# Patient Record
Sex: FEMALE | Race: OTHER | URBAN - METROPOLITAN AREA
[De-identification: names, ages, dates, MRNs, and addresses within clinical notes are randomized per-mention and may not be internally consistent; named-entity substitution may affect disease eponyms.]

---

## 2017-06-16 ENCOUNTER — OUTPATIENT (OUTPATIENT)
Dept: OUTPATIENT SERVICES | Facility: HOSPITAL | Age: 42
LOS: 1 days | Discharge: HOME | End: 2017-06-16

## 2017-06-23 ENCOUNTER — OUTPATIENT (OUTPATIENT)
Dept: OUTPATIENT SERVICES | Facility: HOSPITAL | Age: 42
LOS: 1 days | Discharge: HOME | End: 2017-06-23

## 2017-06-28 DIAGNOSIS — Z12.31 ENCOUNTER FOR SCREENING MAMMOGRAM FOR MALIGNANT NEOPLASM OF BREAST: ICD-10-CM

## 2017-06-28 DIAGNOSIS — R92.8 OTHER ABNORMAL AND INCONCLUSIVE FINDINGS ON DIAGNOSTIC IMAGING OF BREAST: ICD-10-CM

## 2018-12-07 ENCOUNTER — OUTPATIENT (OUTPATIENT)
Dept: OUTPATIENT SERVICES | Facility: HOSPITAL | Age: 43
LOS: 1 days | Discharge: HOME | End: 2018-12-07

## 2018-12-07 DIAGNOSIS — Z12.31 ENCOUNTER FOR SCREENING MAMMOGRAM FOR MALIGNANT NEOPLASM OF BREAST: ICD-10-CM

## 2021-01-21 PROBLEM — Z00.00 ENCOUNTER FOR PREVENTIVE HEALTH EXAMINATION: Status: ACTIVE | Noted: 2021-01-21

## 2021-02-11 ENCOUNTER — APPOINTMENT (OUTPATIENT)
Dept: HEMATOLOGY ONCOLOGY | Facility: CLINIC | Age: 46
End: 2021-02-11
Payer: COMMERCIAL

## 2021-02-11 ENCOUNTER — LABORATORY RESULT (OUTPATIENT)
Age: 46
End: 2021-02-11

## 2021-02-11 ENCOUNTER — OUTPATIENT (OUTPATIENT)
Dept: OUTPATIENT SERVICES | Facility: HOSPITAL | Age: 46
LOS: 1 days | Discharge: HOME | End: 2021-02-11

## 2021-02-11 VITALS
DIASTOLIC BLOOD PRESSURE: 85 MMHG | TEMPERATURE: 98.6 F | WEIGHT: 193 LBS | RESPIRATION RATE: 16 BRPM | HEART RATE: 81 BPM | SYSTOLIC BLOOD PRESSURE: 133 MMHG | HEIGHT: 64 IN | BODY MASS INDEX: 32.95 KG/M2

## 2021-02-11 DIAGNOSIS — Z91.89 OTHER SPECIFIED PERSONAL RISK FACTORS, NOT ELSEWHERE CLASSIFIED: ICD-10-CM

## 2021-02-11 DIAGNOSIS — I63.9 CEREBRAL INFARCTION, UNSPECIFIED: ICD-10-CM

## 2021-02-11 DIAGNOSIS — Z78.9 OTHER SPECIFIED HEALTH STATUS: ICD-10-CM

## 2021-02-11 PROCEDURE — 99205 OFFICE O/P NEW HI 60 MIN: CPT

## 2021-02-11 NOTE — HISTORY OF PRESENT ILLNESS
[de-identified] : Patient is a derrick 44 yo lady with h/o of COVID in 3/2020, with multiple sequelae following including rashes, hypertension, on 12/27/2020 she developed L-sided numbness in leg and arm as well as right-sided facial droop, also associated with visual disturbance, she presented to [facility] ER was initially treated for migraine and vertigo, as per patient MRI confirmed a CVA. Unfortunately i do not have any records of images obtained at [facility] for my review. \par Patient reports residual numbness in her L side and reports she required rehab as Hui Thomas post hospitalization. \par She currently takes Lipitor 80mg, Norvasc 2.5mg and full ASA. \par She reports no prior h/o VTE/CVA, she was adopted, not aware of her family history, she is a former smoker, has h/o PORSCHE BSO 2/2 endometriosis, h/o IVF, uneventful pregnancy, normal vaginal delivery of her 13 yo daughter. She reports h/o 1 miscarriage at 8 weeks. \par She is up to date with age appropriate cancer screening.  [de-identified] : 2/11/2021: Today she is feeling well, reports no problems with ASA, no bleeding, brusing, BP is well controlled. \par We have discussed the etiologies of ischemic vs embolic stroke, since i am missing records i am assuming patient had a cryptogenic stroke and was sent for hypercoagulable work up. We will ask for [facility] records. \par I have explained to her that I recommend follow up with neurology, additional tests may be required. \par If any reason to suspect hypercoagulable state she may need to be anticoagulated. This was explained to her. \par

## 2021-02-11 NOTE — REASON FOR VISIT
[Initial Consultation] : an initial consultation for [Family Member] : family member [FreeTextEntry2] : CVA, referred By Dr. Reyes

## 2021-02-11 NOTE — ASSESSMENT
[FreeTextEntry1] : H/o CVA diagnosed 12/27/2021 at [facility]\par --Obtain all records, will request imaging from [facility]\par --Hypercoag work up today \par --No known family h/o VTE/CVA, patient was adopted \par --Former smoker, former h/o of OCP, h/o IVF, hysterectomy not complicated by VTE\par --1st trimester pregnancy loss, have a 11 yo daughter, uncomplicated pregnancy and delivery, required Lovenox ppx during pregnancy \par --On full ASA\par --On Lipitor 80mg daily, low dose Norvasc\par --H/o COVID in March 2020\par --Neurology follow up \par \par Reports she is up to date on mammograms and pap smears \par \par Follow up in 3 weeks

## 2021-02-11 NOTE — CONSULT LETTER
[Dear  ___] : Dear  [unfilled], [Consult Letter:] : I had the pleasure of evaluating your patient, [unfilled]. [( Thank you for referring [unfilled] for consultation for _____ )] : Thank you for referring [unfilled] for consultation for [unfilled] [Please see my note below.] : Please see my note below. [Consult Closing:] : Thank you very much for allowing me to participate in the care of this patient.  If you have any questions, please do not hesitate to contact me. [Sincerely,] : Sincerely, [FreeTextEntry3] : Valerie Barnett MD

## 2021-02-11 NOTE — PHYSICAL EXAM
[Restricted in physically strenuous activity but ambulatory and able to carry out work of a light or sedentary nature] : Status 1- Restricted in physically strenuous activity but ambulatory and able to carry out work of a light or sedentary nature, e.g., light house work, office work [Normal] : affect appropriate [de-identified] : walks with cane

## 2021-02-12 ENCOUNTER — LABORATORY RESULT (OUTPATIENT)
Age: 46
End: 2021-02-12

## 2021-03-01 DIAGNOSIS — I63.9 CEREBRAL INFARCTION, UNSPECIFIED: ICD-10-CM

## 2021-03-01 DIAGNOSIS — Z91.89 OTHER SPECIFIED PERSONAL RISK FACTORS, NOT ELSEWHERE CLASSIFIED: ICD-10-CM

## 2021-03-01 DIAGNOSIS — Z78.9 OTHER SPECIFIED HEALTH STATUS: ICD-10-CM

## 2021-03-04 ENCOUNTER — APPOINTMENT (OUTPATIENT)
Dept: HEMATOLOGY ONCOLOGY | Facility: CLINIC | Age: 46
End: 2021-03-04

## 2021-08-11 LAB
ALBUMIN SERPL ELPH-MCNC: 4.5 G/DL
ALP BLD-CCNC: 85 U/L
ALT SERPL-CCNC: 27 U/L
ANION GAP SERPL CALC-SCNC: 14 MMOL/L
APTT HEX PL PPP: NEGATIVE
AST SERPL-CCNC: 22 U/L
AT III PPP CHRO-ACNC: 128 %
B2 GLYCOPROT1 IGA SERPL IA-ACNC: <5 SAU
B2 GLYCOPROT1 IGG SER-ACNC: <5 SGU
B2 GLYCOPROT1 IGM SER-ACNC: <5 SMU
BILIRUB SERPL-MCNC: 0.6 MG/DL
BUN SERPL-MCNC: 7 MG/DL
CALCIUM SERPL-MCNC: 11.4 MG/DL
CARDIOLIPIN IGM SER-MCNC: 7.2 MPL
CARDIOLIPIN IGM SER-MCNC: <5 GPL
CHLORIDE SERPL-SCNC: 102 MMOL/L
CO2 SERPL-SCNC: 25 MMOL/L
CONFIRM: 32.7 SEC
CREAT SERPL-MCNC: 0.7 MG/DL
DRVVT IMM 1:2 NP PPP: NORMAL
DRVVT SCREEN TO CONFIRM RATIO: 0.97 RATIO
ESTIMATED AVERAGE GLUCOSE: 137 MG/DL
FERRITIN SERPL-MCNC: 254 NG/ML
GLUCOSE SERPL-MCNC: 88 MG/DL
HBA1C MFR BLD HPLC: 6.4 %
HCT VFR BLD CALC: 41 %
HEX-1: 37.9 SECS
HEX-2: 38.3 SECS
HGB BLD-MCNC: 13.8 G/DL
IRON SATN MFR SERPL: 33 %
IRON SERPL-MCNC: 99 UG/DL
MCHC RBC-ENTMCNC: 30.6 PG
MCHC RBC-ENTMCNC: 33.7 G/DL
MCV RBC AUTO: 90.9 FL
PLATELET # BLD AUTO: 388 K/UL
PMV BLD: 9.2 FL
POTASSIUM SERPL-SCNC: 3.7 MMOL/L
PROT C PPP CHRO-ACNC: 159 %
PROT S AG ACT/NOR PPP IA: 89 %
PROT S FREE PPP-ACNC: 89 %
PROT SERPL-MCNC: 8 G/DL
RBC # BLD: 4.51 M/UL
RBC # FLD: 11.7 %
SARS-COV-2 IGG SERPL IA-ACNC: 27 INDEX
SARS-COV-2 IGG SERPL QL IA: POSITIVE
SCREEN DRVVT: 32.3 SEC
SILICA CLOTTING TIME INTERPRETATION: NORMAL
SILICA CLOTTING TIME S/C: 1.07 RATIO
SODIUM SERPL-SCNC: 141 MMOL/L
TIBC SERPL-MCNC: 296 UG/DL
UIBC SERPL-MCNC: 197 UG/DL
WBC # FLD AUTO: 9.69 K/UL

## 2021-12-02 ENCOUNTER — OUTPATIENT (OUTPATIENT)
Dept: OUTPATIENT SERVICES | Facility: HOSPITAL | Age: 46
LOS: 1 days | Discharge: HOME | End: 2021-12-02
Payer: COMMERCIAL

## 2021-12-02 DIAGNOSIS — Z12.31 ENCOUNTER FOR SCREENING MAMMOGRAM FOR MALIGNANT NEOPLASM OF BREAST: ICD-10-CM

## 2021-12-02 PROCEDURE — 77067 SCR MAMMO BI INCL CAD: CPT | Mod: 26

## 2021-12-02 PROCEDURE — 77063 BREAST TOMOSYNTHESIS BI: CPT | Mod: 26

## 2021-12-08 ENCOUNTER — OUTPATIENT (OUTPATIENT)
Dept: OUTPATIENT SERVICES | Facility: HOSPITAL | Age: 46
LOS: 1 days | Discharge: HOME | End: 2021-12-08
Payer: SELF-PAY

## 2021-12-08 DIAGNOSIS — R92.8 OTHER ABNORMAL AND INCONCLUSIVE FINDINGS ON DIAGNOSTIC IMAGING OF BREAST: ICD-10-CM

## 2021-12-08 PROCEDURE — 77065 DX MAMMO INCL CAD UNI: CPT | Mod: 26,LT

## 2021-12-08 PROCEDURE — 76642 ULTRASOUND BREAST LIMITED: CPT | Mod: 26,LT

## 2021-12-08 PROCEDURE — G0279: CPT | Mod: 26

## 2022-01-05 ENCOUNTER — OUTPATIENT (OUTPATIENT)
Dept: OUTPATIENT SERVICES | Facility: HOSPITAL | Age: 47
LOS: 1 days | Discharge: HOME | End: 2022-01-05
Payer: COMMERCIAL

## 2022-01-05 ENCOUNTER — RESULT REVIEW (OUTPATIENT)
Age: 47
End: 2022-01-05

## 2022-01-05 PROCEDURE — 19083 BX BREAST 1ST LESION US IMAG: CPT | Mod: LT

## 2022-01-05 PROCEDURE — 19081 BX BREAST 1ST LESION STRTCTC: CPT | Mod: LT

## 2022-01-05 PROCEDURE — 88305 TISSUE EXAM BY PATHOLOGIST: CPT | Mod: 26

## 2022-01-06 LAB — SURGICAL PATHOLOGY STUDY: SIGNIFICANT CHANGE UP

## 2022-01-07 DIAGNOSIS — N63.20 UNSPECIFIED LUMP IN THE LEFT BREAST, UNSPECIFIED QUADRANT: ICD-10-CM

## 2022-02-15 ENCOUNTER — APPOINTMENT (OUTPATIENT)
Dept: BREAST CENTER | Facility: CLINIC | Age: 47
End: 2022-02-15
Payer: COMMERCIAL

## 2022-02-15 PROCEDURE — 99203 OFFICE O/P NEW LOW 30 MIN: CPT

## 2022-02-15 NOTE — DATA REVIEWED
[FreeTextEntry1] : EXAM:  MG MAMMO SCREEN W MICHELLE BI#      \par 12/02/2021  \par INTERPRETATION:  HISTORY:\par Bilateral MG MAMMO SCREEN W MICHELLE BI# was performed. Patient is 46 years old and is seen for screening. The patient has no personal history of cancer.  The patient has no family history of breast cancer.\par \par RISK ASSESSMENT:\par NCI Lifetime Risk: 12.8\par Tyrer-Masoodzick Lifetime Risk: 13.0\par \par CLINICAL BREAST EXAM:\par The patient reports her last clinical breast exam was performed over one year ago.\par \par COMPARISON STUDIES:\par The present examination has been compared to prior imaging studies performed at [facility] on 06/16/2017, 06/23/2017 and 12/07/2018.\par \par MAMMOGRAM FINDINGS:\par Mammography was performed including the following views: bilateral craniocaudal with tomosynthesis, bilateral mediolateral oblique with tomosynthesis, and left mediolateral oblique.  The examination includes digital synthetic 2D and digital tomosynthesis 3D images. Additional imaging analysis was performed using CAD (computer-aided detection) software.\par \par The breasts are heterogeneously dense, which may obscure small masses.\par \par There is an asymmetry seen in the central of the left breast.\par \par No suspicious mass, grouping of calcifications, or other abnormality is identified in the right breast.\par \par IMPRESSION:\par Asymmetry in the left breast requires additional evaluation.\par \par RECOMMENDATION:\par Patient will be recalled for additional mammographic views and breast ultrasound.\par \par ASSESSMENT:\par BI-RADS Category 0:  Incomplete: Needs Additional Imaging Evaluation\par EXAM:  MG US BREAST LIMITED LT      \par EXAM:  MG MAMMO DIAG W MICHELLE LT#      \par \par *** ADDENDUM ***\par \par Findings and recommendations were discussed with CARMITA Ospina, on \par 12/92/2021 at 3:37 PM.\par \par --- End of Report ---\par \par *** END OF ADDENDUM ***\par   12/08/2021  \par INTERPRETATION:  CLINICAL HISTORY: Additional imaging requested from \par screening mammogram. The patient returns for additional imaging of an \par asymmetry in the left breast.\par \par The patient reports her last clinical breast examination was performed \par over one year ago.\par \par FAMILY HISTORY: None.\par \par COMPARISONS: Mammograms back to 2015.\par \par BREAST COMPOSITION: The breasts are heterogeneously dense, which may \par obscure small masses.\par \par VIEWS: 2D/tomosynthesis ML and spot views of the left breast. \par Computer-aided detection was utilized by the radiologists in the \par interpretation of this examination.\par \par \par FINDINGS:\par \par MAMMOGRAM:\par Slightly irregular mass in the left lateral breast corresponds to the \par abnormality noted on the screening mammogram.\par \par No suspicious calcifications are seen.\par \par ULTRASOUND:\par Targeted left breast ultrasound was performed.\par \par Benign intramammary lymph node is seen in the left breast, 3:00 axis, 12 \par cm from the nipple. 2 adjacent small masses in the left breast, 3-4 \par o'clock axis, 2 cm from the nipple measure up to 0.4 cm. These are an \par incidental finding and are indeterminate. Ultrasound-guided biopsy is \par recommended.\par \par \par IMPRESSION:\par \par 1. Mammographically identified mass in the left breast. Stereotactic \par guided biopsy is recommended.\par 2. Indeterminate left breast masses seen on ultrasound. Ultrasound-guided \par biopsy is recommended.\par \par Recommendation: Stereotactic guided biopsy.\par \par BI-RADS Category 4: Suspicious\par EXAM:  MG STEREO BX 1ST LT SISC      \par EXAM:  MG US BX BRST 1ST LT SISC      \par \par *** ADDENDUM ***\par \par FINAL DIAGNOSIS\par \par 1. BREAST, LEFT 3-4 N2 MASS, ULTRASOUND GUIDED NEEDLE CORE BIOPSIES:\par - INTRADUCTAL PAPILLOMA CONTAINING FLORID DUCT HYPERPLASIA ARISING IN A\par BACKGROUND OF NODULAR SCLEROSING ADENOSIS.\par \par Findings are benign high risk concordant.\par \par 2. BREAST, LEFT UPPER OUTER QUADRANT MASS, STEREOTACTIC GUIDED VACUUM\par ASSISTED NEEDLE CORE BIOPSIES:\par - BENIGN PREDOMINANTLY FATTY BREAST TISSUE WITH FRESH HEMORRHAGE, BUT\par OTHERWISE WITHOUT HISTOPATHOLOGIC ABNORMALITY\par \par Findings are benign concordant.\par \par Recommendations: Surgical management of the benign high risk lesion is \par recommended.\par \par Findings and recommendations were discussed with the patient on 1/7/2022.\par \par --- End of Report ---\par \par *** END OF ADDENDUM ***\par \par \par \par PROCEDURE DATE:  01/05/2022  \par INTERPRETATION:  Clinical History / Reason for exam: Left breast masses\par \par TECHNIQUE: Previous films and reports were reviewed. The procedure, its \par risks, benefits, and alternatives were explained to the patient, who \par expressed understanding and gave informed written and verbal consent. A \par time-out, which included the patient's full name, date of birth, \par description of the expected procedure and procedure site, was performed \par immediately before the procedure.\par \par Site 1:\par Using the usual sterile technique and ultrasound guidance, the previously \par described mass in the left breast 3-4:00 was biopsied utilizing a \par 14-gauge automated Bard core biopsy device with a 13-gauge introducer \par sheath. 5 samples were collected.  A marking clip ([employer] stop-light) \par was deployed under ultrasound guidance. Hemostasis was obtained and a \par sterile dressing was applied.\par \par Site 2:\par The left breast   was compressed and stereo and tomographic images were \par obtained to locate the mass. The mass is located in the upper outer \par quadrant of the breast. A superior approach was used. The area was \par prepped and draped in the normal sterile fashion.  The skin was \par anesthetized with 5 mL buffered 1% lidocaine. A small skin incision was \par made.  Through the incision, using a biopsy gun, a 9 gauge needle was \par introduced and pre-and post-fire stereo images were obtained. Deep \par anesthesia was given with 10 mL 1% lidocaine with epinephrine. Numerous \par specimens were taken. The specimens were x-rayed and several of them \par contain microcalcifications.\par \par Through the needle a radiopaque marker ([employer] mini-cork) was deposited. \par  The needle was removed and the breast was compressed to achieve \par hemostasis. The patient tolerated the procedure well and there was no \par immediate post procedure complication.\par \par The specimens were sent to pathology.\par \par Post procedure two view mammogram demonstrates the marking clip to be in \par good position.\par \par IMPRESSION:\par \par Successful ultrasound guided and stereotactic core biopsy of the left \par breast.\par

## 2022-02-15 NOTE — ASSESSMENT
[FreeTextEntry1] : Lizzie is 46 year old female here with new dx of left breast intraductal papilloma\par On physical exam, there are no discrete masses in either breast or axilla. There is no nipple discharge or inversion bilaterally. There are no skin changes bilaterally.\par \par Her imaging is as follows:\par 12/02/2021 b/l mammo\par -breasts are heterogeneously dense\par -asymmetry seen in the central of the left breast-->BI-RADS 0\par \par 12/08/2021  LEFT dx mammo and US\par - breasts are heterogeneously dense\par -Slightly irregular mass in the left lateral breast -->Stereotactic guided biopsy\par \par LEFT US:\par -@3N12 Benign intramammary lymph node\par .-@ 3-4 N 2, 2 adjacent small masses measure up to 0.4 cm-->. Ultrasound-guided biopsy\par BIRADS4\par \par \par 01/05/2022  \par LEFT stereotactic bx (mini-cork) \par - BENIGN PREDOMINANTLY FATTY BREAST TISSUE WITH FRESH HEMORRHAGE, BUT\par OTHERWISE WITHOUT HISTOPATHOLOGIC ABNORMALITY\par \par LEFT US guided bx (stop-light) )\par - INTRADUCTAL PAPILLOMA CONTAINING FLORID DUCT HYPERPLASIA ARISING IN A\par BACKGROUND OF NODULAR SCLEROSING ADENOSIS.\par \par We discussed dense breasts.  Increasing breast density has been found to increase ones risk of breast cancer, but at this time, there is no clear indication for additional imaging in this setting, as both US and MRI have not been found to improve survival.  One can consider bilateral screening US.  However, out of 1000 women screened, the use of routine US will only identify an additional 3-5 cancers.  The use of US was found to increase the likelihood of undergoing more imaging and more biopsies.  She does have dense breasts.  We have decided to proceed with screening bilateral breast US at this time.  This will be scheduled with her next screening mammogram.\par \par Intraductal papillomas without atypia are considered fibroproliferative lesions without atypia.  Patients with these lesions were found to have a slightly increased relative risk of breast cancer compared to the reference population.  However the lesions themselves do not have any malignant potential. \par \par Although newer studies regarding the upgrade rate (to cancer) ranges from 0-14% on surgical excision, with pathologic/radiologic concordance, older studies found a higher upgrade rate.  I have recommended surgical excision for this reason.  Because it is not readily palpable, I will have her undergo a preoperative radiofrequency tag localization.  \par \par The risks and benefits of the procedure were explained to the patient, including bleeding, infection, seroma/hematoma formation, and possible re-operation if the surgical excision yields an upgrade to cancer with positive margins. Informed consent was obtained today.\par \par \par PLAN:\par -DX: LEFT BREAST INTRADUCTAL PAPILLOMA \par -OR: LEFT WLE with RF localization\par -follow up after surgery

## 2022-02-15 NOTE — HISTORY OF PRESENT ILLNESS
[FreeTextEntry1] : Lizzie is 46 year old female here with new dx of left breast intraductal papilloma\par \par She has no breast related complaints at this time.  She denies any breast pain, has not palpated any new palpable masses in either breast and denies any nipple discharge or retraction.\par \par Her imaging is as follows:\par 2021 b/l mammo\par -breasts are heterogeneously dense\par -asymmetry seen in the central of the left breast-->BI-RADS 0\par \par 2021  LEFT dx mammo and US\par - breasts are heterogeneously dense\par -Slightly irregular mass in the left lateral breast -->Stereotactic guided biopsy\par \par LEFT US:\par -@3N12 Benign intramammary lymph node\par .-@ 3-4 N 2, 2 adjacent small masses measure up to 0.4 cm-->. Ultrasound-guided biopsy\par BIRADS4\par \par \par 2022  \par LEFT stereotactic bx (mini-cork) \par - BENIGN PREDOMINANTLY FATTY BREAST TISSUE WITH FRESH HEMORRHAGE, BUT\par OTHERWISE WITHOUT HISTOPATHOLOGIC ABNORMALITY\par \par LEFT US guided bx (stop-light) )\par - INTRADUCTAL PAPILLOMA CONTAINING FLORID DUCT HYPERPLASIA ARISING IN A\par BACKGROUND OF NODULAR SCLEROSING ADENOSIS.\par \par HISTORICAL RISK FACTORS:\par -unknown fam hx of breast or ovarian cancer\par -previoius breast bx  x2 current\par - first born at age 32\par -PORSCHE in 2016 for endometriosis \par -OCP use x 3 years

## 2022-02-28 ENCOUNTER — NON-APPOINTMENT (OUTPATIENT)
Age: 47
End: 2022-02-28

## 2022-03-14 ENCOUNTER — OUTPATIENT (OUTPATIENT)
Dept: OUTPATIENT SERVICES | Facility: HOSPITAL | Age: 47
LOS: 1 days | Discharge: HOME | End: 2022-03-14
Payer: COMMERCIAL

## 2022-03-14 VITALS
SYSTOLIC BLOOD PRESSURE: 154 MMHG | WEIGHT: 194.01 LBS | DIASTOLIC BLOOD PRESSURE: 94 MMHG | HEIGHT: 64 IN | RESPIRATION RATE: 16 BRPM | TEMPERATURE: 99 F | OXYGEN SATURATION: 100 % | HEART RATE: 98 BPM

## 2022-03-14 DIAGNOSIS — D24.2 BENIGN NEOPLASM OF LEFT BREAST: ICD-10-CM

## 2022-03-14 DIAGNOSIS — Z01.818 ENCOUNTER FOR OTHER PREPROCEDURAL EXAMINATION: ICD-10-CM

## 2022-03-14 DIAGNOSIS — Z90.710 ACQUIRED ABSENCE OF BOTH CERVIX AND UTERUS: Chronic | ICD-10-CM

## 2022-03-14 LAB
ALBUMIN SERPL ELPH-MCNC: 5 G/DL — SIGNIFICANT CHANGE UP (ref 3.5–5.2)
ALP SERPL-CCNC: 99 U/L — SIGNIFICANT CHANGE UP (ref 30–115)
ALT FLD-CCNC: 34 U/L — SIGNIFICANT CHANGE UP (ref 0–41)
ANION GAP SERPL CALC-SCNC: 15 MMOL/L — HIGH (ref 7–14)
APTT BLD: 33.9 SEC — SIGNIFICANT CHANGE UP (ref 27–39.2)
AST SERPL-CCNC: 26 U/L — SIGNIFICANT CHANGE UP (ref 0–41)
BASOPHILS # BLD AUTO: 0.09 K/UL — SIGNIFICANT CHANGE UP (ref 0–0.2)
BASOPHILS NFR BLD AUTO: 0.7 % — SIGNIFICANT CHANGE UP (ref 0–1)
BILIRUB SERPL-MCNC: 0.4 MG/DL — SIGNIFICANT CHANGE UP (ref 0.2–1.2)
BUN SERPL-MCNC: 13 MG/DL — SIGNIFICANT CHANGE UP (ref 10–20)
CALCIUM SERPL-MCNC: 10 MG/DL — SIGNIFICANT CHANGE UP (ref 8.5–10.1)
CHLORIDE SERPL-SCNC: 97 MMOL/L — LOW (ref 98–110)
CO2 SERPL-SCNC: 26 MMOL/L — SIGNIFICANT CHANGE UP (ref 17–32)
CREAT SERPL-MCNC: 0.6 MG/DL — LOW (ref 0.7–1.5)
EGFR: 112 ML/MIN/1.73M2 — SIGNIFICANT CHANGE UP
EOSINOPHIL # BLD AUTO: 0.23 K/UL — SIGNIFICANT CHANGE UP (ref 0–0.7)
EOSINOPHIL NFR BLD AUTO: 1.7 % — SIGNIFICANT CHANGE UP (ref 0–8)
GLUCOSE SERPL-MCNC: 87 MG/DL — SIGNIFICANT CHANGE UP (ref 70–99)
HCT VFR BLD CALC: 40.5 % — SIGNIFICANT CHANGE UP (ref 37–47)
HGB BLD-MCNC: 13.1 G/DL — SIGNIFICANT CHANGE UP (ref 12–16)
IMM GRANULOCYTES NFR BLD AUTO: 0.4 % — HIGH (ref 0.1–0.3)
INR BLD: 0.93 RATIO — SIGNIFICANT CHANGE UP (ref 0.65–1.3)
LYMPHOCYTES # BLD AUTO: 1.95 K/UL — SIGNIFICANT CHANGE UP (ref 1.2–3.4)
LYMPHOCYTES # BLD AUTO: 14.5 % — LOW (ref 20.5–51.1)
MCHC RBC-ENTMCNC: 29 PG — SIGNIFICANT CHANGE UP (ref 27–31)
MCHC RBC-ENTMCNC: 32.3 G/DL — SIGNIFICANT CHANGE UP (ref 32–37)
MCV RBC AUTO: 89.6 FL — SIGNIFICANT CHANGE UP (ref 81–99)
MONOCYTES # BLD AUTO: 0.82 K/UL — HIGH (ref 0.1–0.6)
MONOCYTES NFR BLD AUTO: 6.1 % — SIGNIFICANT CHANGE UP (ref 1.7–9.3)
NEUTROPHILS # BLD AUTO: 10.27 K/UL — HIGH (ref 1.4–6.5)
NEUTROPHILS NFR BLD AUTO: 76.6 % — HIGH (ref 42.2–75.2)
NRBC # BLD: 0 /100 WBCS — SIGNIFICANT CHANGE UP (ref 0–0)
PLATELET # BLD AUTO: 377 K/UL — SIGNIFICANT CHANGE UP (ref 130–400)
POTASSIUM SERPL-MCNC: 4.2 MMOL/L — SIGNIFICANT CHANGE UP (ref 3.5–5)
POTASSIUM SERPL-SCNC: 4.2 MMOL/L — SIGNIFICANT CHANGE UP (ref 3.5–5)
PROT SERPL-MCNC: 8.3 G/DL — HIGH (ref 6–8)
PROTHROM AB SERPL-ACNC: 10.7 SEC — SIGNIFICANT CHANGE UP (ref 9.95–12.87)
RBC # BLD: 4.52 M/UL — SIGNIFICANT CHANGE UP (ref 4.2–5.4)
RBC # FLD: 12.5 % — SIGNIFICANT CHANGE UP (ref 11.5–14.5)
SODIUM SERPL-SCNC: 138 MMOL/L — SIGNIFICANT CHANGE UP (ref 135–146)
WBC # BLD: 13.41 K/UL — HIGH (ref 4.8–10.8)
WBC # FLD AUTO: 13.41 K/UL — HIGH (ref 4.8–10.8)

## 2022-03-14 PROCEDURE — 93010 ELECTROCARDIOGRAM REPORT: CPT

## 2022-03-14 NOTE — H&P PST ADULT - HISTORY OF PRESENT ILLNESS
46 year old female here for above procedure  pt had mammography + lump, had further testing biopsy, benign tumor now, but needs to be excised.  pt denies cp, sob, medley or palpitations  pt denies urinary frequency, urgency or burning  ex mary ann 3 fos  Anesthesia Alert  NO--Difficult Airway  NO--History of neck surgery or radiation  NO--Limited ROM of neck  NO--History of Malignant hyperthermia  NO--Personal or family history of Pseudocholinesterase deficiency  NO--Prior Anesthesia Complication  NO--Latex Allergy  NO--Loose teeth  NO--History of Rheumatoid Arthritis  NO--SHERYL  +BLEEDING RISKS ON   Pt denies any s/s covid 19 and reports no contact with known positive people. Pt has appointment for repeat covid testing pre op and instructed to continue to self monitor and report any concerns to MD. Pt will continue to practice self isolation and  exposure control measures pre op

## 2022-03-14 NOTE — H&P PST ADULT - NSICDXPASTMEDICALHX_GEN_ALL_CORE_FT
PAST MEDICAL HISTORY:  2019 novel coronavirus disease (COVID-19)     CVA (cerebrovascular accident) left sided post COVID, had rehab, only mild residual weakness when tired    HTN (hypertension) post Pt denies any s/s covid 19 and reports no contact with known positive people. Pt has appointment for repeat covid testing pre op and instructed to continue to self monitor and report any concerns to MD. Pt will continue to practice self isolation and  exposure control measures pre op    Hypercholesterolemia

## 2022-03-14 NOTE — H&P PST ADULT - NSICDXPASTSURGICALHX_GEN_ALL_CORE_FT
PAST SURGICAL HISTORY:  S/P PORSCHE-BSO (total abdominal hysterectomy and bilateral salpingo-oophorectomy)

## 2022-03-15 ENCOUNTER — NON-APPOINTMENT (OUTPATIENT)
Age: 47
End: 2022-03-15

## 2022-03-16 ENCOUNTER — NON-APPOINTMENT (OUTPATIENT)
Age: 47
End: 2022-03-16

## 2022-03-29 ENCOUNTER — LABORATORY RESULT (OUTPATIENT)
Age: 47
End: 2022-03-29

## 2022-03-29 ENCOUNTER — RESULT REVIEW (OUTPATIENT)
Age: 47
End: 2022-03-29

## 2022-03-29 ENCOUNTER — OUTPATIENT (OUTPATIENT)
Dept: OUTPATIENT SERVICES | Facility: HOSPITAL | Age: 47
LOS: 1 days | Discharge: HOME | End: 2022-03-29
Payer: COMMERCIAL

## 2022-03-29 DIAGNOSIS — Z90.710 ACQUIRED ABSENCE OF BOTH CERVIX AND UTERUS: Chronic | ICD-10-CM

## 2022-03-29 PROBLEM — I63.9 CEREBRAL INFARCTION, UNSPECIFIED: Chronic | Status: ACTIVE | Noted: 2022-03-14

## 2022-03-29 PROBLEM — I10 ESSENTIAL (PRIMARY) HYPERTENSION: Chronic | Status: ACTIVE | Noted: 2022-03-14

## 2022-03-29 PROBLEM — E78.00 PURE HYPERCHOLESTEROLEMIA, UNSPECIFIED: Chronic | Status: ACTIVE | Noted: 2022-03-14

## 2022-03-29 PROBLEM — U07.1 COVID-19: Chronic | Status: ACTIVE | Noted: 2022-03-14

## 2022-03-29 PROCEDURE — 19281 PERQ DEVICE BREAST 1ST IMAG: CPT | Mod: LT

## 2022-03-31 NOTE — ASU PATIENT PROFILE, ADULT - FALL HARM RISK - UNIVERSAL INTERVENTIONS
Bed in lowest position, wheels locked, appropriate side rails in place/Call bell, personal items and telephone in reach/Instruct patient to call for assistance before getting out of bed or chair/Non-slip footwear when patient is out of bed/Mallie to call system/Physically safe environment - no spills, clutter or unnecessary equipment/Purposeful Proactive Rounding/Room/bathroom lighting operational, light cord in reach

## 2022-04-01 ENCOUNTER — RESULT REVIEW (OUTPATIENT)
Age: 47
End: 2022-04-01

## 2022-04-01 ENCOUNTER — APPOINTMENT (OUTPATIENT)
Dept: BREAST CENTER | Facility: AMBULATORY SURGERY CENTER | Age: 47
End: 2022-04-01

## 2022-04-01 ENCOUNTER — TRANSCRIPTION ENCOUNTER (OUTPATIENT)
Age: 47
End: 2022-04-01

## 2022-04-01 ENCOUNTER — OUTPATIENT (OUTPATIENT)
Dept: OUTPATIENT SERVICES | Facility: HOSPITAL | Age: 47
LOS: 1 days | Discharge: HOME | End: 2022-04-01
Payer: COMMERCIAL

## 2022-04-01 VITALS
OXYGEN SATURATION: 98 % | SYSTOLIC BLOOD PRESSURE: 129 MMHG | HEIGHT: 64 IN | DIASTOLIC BLOOD PRESSURE: 98 MMHG | RESPIRATION RATE: 18 BRPM | HEART RATE: 86 BPM | WEIGHT: 194.01 LBS | TEMPERATURE: 98 F

## 2022-04-01 VITALS
OXYGEN SATURATION: 97 % | TEMPERATURE: 98 F | SYSTOLIC BLOOD PRESSURE: 117 MMHG | DIASTOLIC BLOOD PRESSURE: 76 MMHG | HEART RATE: 82 BPM | RESPIRATION RATE: 15 BRPM

## 2022-04-01 DIAGNOSIS — Z90.710 ACQUIRED ABSENCE OF BOTH CERVIX AND UTERUS: Chronic | ICD-10-CM

## 2022-04-01 PROCEDURE — 88341 IMHCHEM/IMCYTCHM EA ADD ANTB: CPT | Mod: 26

## 2022-04-01 PROCEDURE — 19301 PARTIAL MASTECTOMY: CPT | Mod: LT

## 2022-04-01 PROCEDURE — 88305 TISSUE EXAM BY PATHOLOGIST: CPT | Mod: 26

## 2022-04-01 PROCEDURE — 88342 IMHCHEM/IMCYTCHM 1ST ANTB: CPT | Mod: 26

## 2022-04-01 RX ORDER — HYDROMORPHONE HYDROCHLORIDE 2 MG/ML
0.5 INJECTION INTRAMUSCULAR; INTRAVENOUS; SUBCUTANEOUS
Refills: 0 | Status: DISCONTINUED | OUTPATIENT
Start: 2022-04-01 | End: 2022-04-01

## 2022-04-01 RX ORDER — ASPIRIN/CALCIUM CARB/MAGNESIUM 324 MG
0 TABLET ORAL
Qty: 0 | Refills: 0 | DISCHARGE

## 2022-04-01 RX ORDER — AMLODIPINE BESYLATE 2.5 MG/1
1 TABLET ORAL
Qty: 0 | Refills: 0 | DISCHARGE

## 2022-04-01 RX ORDER — ONDANSETRON 8 MG/1
4 TABLET, FILM COATED ORAL ONCE
Refills: 0 | Status: DISCONTINUED | OUTPATIENT
Start: 2022-04-01 | End: 2022-04-15

## 2022-04-01 RX ORDER — ATORVASTATIN CALCIUM 80 MG/1
1 TABLET, FILM COATED ORAL
Qty: 0 | Refills: 0 | DISCHARGE

## 2022-04-01 RX ORDER — IBUPROFEN 200 MG
2 TABLET ORAL
Qty: 42 | Refills: 0
Start: 2022-04-01 | End: 2022-04-07

## 2022-04-01 RX ORDER — ACETAMINOPHEN 500 MG
2 TABLET ORAL
Qty: 56 | Refills: 0
Start: 2022-04-01 | End: 2022-04-07

## 2022-04-01 NOTE — ASU DISCHARGE PLAN (ADULT/PEDIATRIC) - CALL YOUR DOCTOR IF YOU HAVE ANY OF THE FOLLOWING:
100.4 F/Bleeding that does not stop/Swelling that gets worse/Pain not relieved by Medications/Fever greater than (need to indicate Fahrenheit or Celsius)/Wound/Surgical Site with redness, or foul smelling discharge or pus

## 2022-04-01 NOTE — PRE-ANESTHESIA EVALUATION ADULT - NSANTHPMHFT_GEN_ALL_CORE
cva in setting of covid 2020, minimal residual weakness/numbess on left side  on asa/lipitor/amlodipine for secondary prevention    METS>4 without CP/palpitations/sob  Denies orthopnea

## 2022-04-01 NOTE — ASU DISCHARGE PLAN (ADULT/PEDIATRIC) - ASU DC SPECIAL INSTRUCTIONSFT
You are being discharged home. Wear sport bra for extra support. Take pain meds as directed. Can restart aspirin tomorrow if no bleeding or hematoma over incision site. Call Dr. Em's office to confirm your scheduled appointment.

## 2022-04-01 NOTE — ASU DISCHARGE PLAN (ADULT/PEDIATRIC) - NS MD DC FALL RISK RISK
For information on Fall & Injury Prevention, visit: [email or web address] OR  [email or web address] OR  https://www.cdc.gov/steadi/patient.html

## 2022-04-01 NOTE — CHART NOTE - NSCHARTNOTEFT_GEN_A_CORE
PACU ANESTHESIA ADMISSION NOTE      Procedure: Left breast lumpectomy      Post op diagnosis:  Intraductal papilloma of left breast        ____  Intubated  TV:______       Rate: ______      FiO2: ______    ___x_  Patent Airway    _x___  Full return of protective reflexes    _x___  Full recovery from anesthesia / back to baseline     Vitals:   T:  98         R:      15            BP:  123/69                Sat:       100            P: 98      Mental Status:  ___x_ Awake   _____ Alert   _____ Drowsy   _____ Sedated    Nausea/Vomiting:  __x__ NO  ______Yes,   See Post - Op Orders          Pain Scale (0-10):  _____    Treatment: ____ None    ____x See Post - Op/PCA Orders    Post - Operative Fluids:   ____ Oral   _x___ See Post - Op Orders    Plan: Discharge:   ___x_Home       _____Floor     _____Critical Care    _____  Other:_________________    Comments: patient tolerated procedure well

## 2022-04-01 NOTE — ASU DISCHARGE PLAN (ADULT/PEDIATRIC) - CARE PROVIDER_API CALL
Rebecca Em)  Surgery  [facility], [address] [place] [address]  Phone: [phone]  Fax: [phone]  Scheduled Appointment: 04/14/2022 04:00 PM

## 2022-04-01 NOTE — PRE-ANESTHESIA EVALUATION ADULT - NSANTHADDINFOFT_GEN_ALL_CORE
MAC planned, backup GA  Discussed risks, including dental injury and more serious complications including cardiac and pulmonary complications and stroke.  Patient expresses understanding with regard to risks of anesthesia and wishes to proceed.

## 2022-04-06 DIAGNOSIS — N60.22 FIBROADENOSIS OF LEFT BREAST: ICD-10-CM

## 2022-04-06 DIAGNOSIS — I10 ESSENTIAL (PRIMARY) HYPERTENSION: ICD-10-CM

## 2022-04-06 DIAGNOSIS — Z79.82 LONG TERM (CURRENT) USE OF ASPIRIN: ICD-10-CM

## 2022-04-06 DIAGNOSIS — N60.92 UNSPECIFIED BENIGN MAMMARY DYSPLASIA OF LEFT BREAST: ICD-10-CM

## 2022-04-06 DIAGNOSIS — E78.00 PURE HYPERCHOLESTEROLEMIA, UNSPECIFIED: ICD-10-CM

## 2022-04-06 DIAGNOSIS — D24.2 BENIGN NEOPLASM OF LEFT BREAST: ICD-10-CM

## 2022-04-06 DIAGNOSIS — Z86.19 PERSONAL HISTORY OF OTHER INFECTIOUS AND PARASITIC DISEASES: ICD-10-CM

## 2022-04-06 LAB — SURGICAL PATHOLOGY STUDY: SIGNIFICANT CHANGE UP

## 2022-04-07 DIAGNOSIS — N64.2 ATROPHY OF BREAST: ICD-10-CM

## 2022-04-07 DIAGNOSIS — N60.22 FIBROADENOSIS OF LEFT BREAST: ICD-10-CM

## 2022-04-07 DIAGNOSIS — N60.32 FIBROSCLEROSIS OF LEFT BREAST: ICD-10-CM

## 2022-04-07 DIAGNOSIS — N63.23 UNSPECIFIED LUMP IN THE LEFT BREAST, LOWER OUTER QUADRANT: ICD-10-CM

## 2022-04-07 DIAGNOSIS — D24.2 BENIGN NEOPLASM OF LEFT BREAST: ICD-10-CM

## 2022-04-11 PROBLEM — R92.2 DENSE BREAST TISSUE: Status: ACTIVE | Noted: 2022-02-15

## 2022-04-11 PROBLEM — D24.2 INTRADUCTAL PAPILLOMA OF BREAST, LEFT: Status: ACTIVE | Noted: 2022-02-15

## 2022-04-14 ENCOUNTER — APPOINTMENT (OUTPATIENT)
Dept: BREAST CENTER | Facility: CLINIC | Age: 47
End: 2022-04-14
Payer: COMMERCIAL

## 2022-04-14 VITALS
HEIGHT: 64 IN | DIASTOLIC BLOOD PRESSURE: 90 MMHG | WEIGHT: 193 LBS | TEMPERATURE: 97.2 F | BODY MASS INDEX: 32.95 KG/M2 | SYSTOLIC BLOOD PRESSURE: 155 MMHG

## 2022-04-14 DIAGNOSIS — D24.2 BENIGN NEOPLASM OF LEFT BREAST: ICD-10-CM

## 2022-04-14 DIAGNOSIS — R92.2 INCONCLUSIVE MAMMOGRAM: ICD-10-CM

## 2022-04-14 PROCEDURE — 99024 POSTOP FOLLOW-UP VISIT: CPT

## 2022-04-14 NOTE — REASON FOR VISIT
[Follow-Up: _____] : a [unfilled] follow-up visit [Parent] : parent [FreeTextEntry1] : s/p Left WLE with RF loc on 04/01/2022.

## 2022-04-14 NOTE — DATA REVIEWED
[FreeTextEntry1] :  4/1/2022 10:00 EDT\par \par Surgical Pathology Report - Auth (Verified)\par \par Specimen(s) Submitted\par 1  Left breast mass\par Time obtained: 9:44 am\par 2  Left breast inferior margin\par Time obtained: 9:46 am\par \par Final Diagnosis\par 1.  Breast, left LOQ 3-4 N2 mass, radiofrequency seed localized\par \par lumpectomy:\par - Benign early atrophic breast tissue with proliferative type fibrocystic\par changes including florid duct hyperplasia, focal stromal fibrosis, and\par sclerosing adenosis.\par - Healing prior biopsy site with no residual intraductal papillary lesion\par identified in this entirely submitted specimen.\par \par 2.  Breast, left inferior margin, excision:\par - Benign predominantly fatty early atrophic breast tissue with\par proliferative type fibrocystic changes including usual duct hyperplasia.\par

## 2022-04-14 NOTE — ASSESSMENT
[FreeTextEntry1] : Lizzie is 46 year old female here with new dx of left breast intraductal papilloma.\par \par She presents today in follow up to the office for her initial post-operative visit.\par She is feeling well.\par She denies any fever / chills or erythema and / or drainage related to the incision.\par Her pain is well controlled, only complains of mild soreness of the area.\par \par Her pathology was discussed-->Healing prior biopsy site with no residual intraductal papillary lesion.\par \par On physical exam, incision healing well. no erythema noted. no drainage noted.\par ---\par Her imaging is as follows:\par 12/02/2021 b/l mammo\par -breasts are heterogeneously dense\par -asymmetry seen in the central of the left breast-->BI-RADS 0\par \par 12/08/2021  LEFT dx mammo and US\par - breasts are heterogeneously dense\par -Slightly irregular mass in the left lateral breast -->Stereotactic guided biopsy\par \par LEFT US:\par -@3N12 Benign intramammary lymph node\par .-@ 3-4 N 2, 2 adjacent small masses measure up to 0.4 cm-->. Ultrasound-guided biopsy\par BIRADS4\par \par \par 01/05/2022  \par LEFT stereotactic bx (mini-cork) \par - BENIGN PREDOMINANTLY FATTY BREAST TISSUE WITH FRESH HEMORRHAGE, BUT\par OTHERWISE WITHOUT HISTOPATHOLOGIC ABNORMALITY\par \par LEFT US guided bx (stop-light) )\par - INTRADUCTAL PAPILLOMA CONTAINING FLORID DUCT HYPERPLASIA ARISING IN A\par BACKGROUND OF NODULAR SCLEROSING ADENOSIS.\par \par We discussed dense breasts.  Increasing breast density has been found to increase ones risk of breast cancer, but at this time, there is no clear indication for additional imaging in this setting, as both US and MRI have not been found to improve survival.  One can consider bilateral screening US.  However, out of 1000 women screened, the use of routine US will only identify an additional 3-5 cancers.  The use of US was found to increase the likelihood of undergoing more imaging and more biopsies.  She does have dense breasts.  We have decided to proceed with screening bilateral breast US at this time.  This will be scheduled with her next screening mammogram.\par \par Intraductal papillomas without atypia are considered fibroproliferative lesions without atypia.  Patients with these lesions were found to have a slightly increased relative risk of breast cancer compared to the reference population.  However the lesions themselves do not have any malignant potential. \par \par Although newer studies regarding the upgrade rate (to cancer) ranges from 0-14% on surgical excision, with pathologic/radiologic concordance, older studies found a higher upgrade rate.  I have recommended surgical excision for this reason.  Because it is not readily palpable, I will have her undergo a preoperative radiofrequency tag localization.  \par \par The risks and benefits of the procedure were explained to the patient, including bleeding, infection, seroma/hematoma formation, and possible re-operation if the surgical excision yields an upgrade to cancer with positive margins. Informed consent was obtained today.\par \par \par PLAN:\par -B/L Dx Mammo & Sono - December 2022.\par -follow up after.

## 2022-04-14 NOTE — HISTORY OF PRESENT ILLNESS
[FreeTextEntry1] : Lizzie is 46 year old female here with new dx of left breast intraductal papilloma\par \par She has no breast related complaints at this time.  She denies any breast pain, has not palpated any new palpable masses in either breast and denies any nipple discharge or retraction.\par \par Her imaging is as follows:\par 2021 b/l mammo\par -breasts are heterogeneously dense\par -asymmetry seen in the central of the left breast-->BI-RADS 0\par \par 2021  LEFT dx mammo and US\par - breasts are heterogeneously dense\par -Slightly irregular mass in the left lateral breast -->Stereotactic guided biopsy\par \par LEFT US:\par -@3N12 Benign intramammary lymph node\par .-@ 3-4 N 2, 2 adjacent small masses measure up to 0.4 cm-->. Ultrasound-guided biopsy\par BIRADS4\par \par \par 2022  \par LEFT stereotactic bx (mini-cork) \par - BENIGN PREDOMINANTLY FATTY BREAST TISSUE WITH FRESH HEMORRHAGE, BUT\par OTHERWISE WITHOUT HISTOPATHOLOGIC ABNORMALITY\par \par LEFT US guided bx (stop-light) )\par - INTRADUCTAL PAPILLOMA CONTAINING FLORID DUCT HYPERPLASIA ARISING IN A\par BACKGROUND OF NODULAR SCLEROSING ADENOSIS.\par \par HISTORICAL RISK FACTORS:\par -unknown fam hx of breast or ovarian cancer\par -previoius breast bx  x2 current\par - first born at age 32\par -PORSCHE in 2016 for endometriosis \par -OCP use x 3 years \par \par \par INTERVAL HISTORY:\par 2022 --\par LIZZIE JO is a 46 year old female patient who presents today in follow up to the office for her initial post-operative visit.\par She is feeling well.\par She denies any fever / chills or erythema and / or drainage related to the incision.\par Her pain is well controlled, only complains of mild soreness of the area.\par \par Her pathology was discussed-->Healing prior biopsy site with no residual intraductal papillary lesion.\par \par \par

## 2022-09-28 NOTE — H&P PST ADULT - MALLAMPATI CLASS
Last medication check 8/22/22  Last physical 8/22/22  Last refill 8/22/22  Medication check due 2/2023    Please advise refill request concerta    PDMP verified   Prescribed: 8/22/2022  Dispensed: 8/22/2022     Class III - visualization of the soft palate and the base of the uvula

## 2022-12-13 ENCOUNTER — APPOINTMENT (OUTPATIENT)
Dept: BREAST CENTER | Facility: CLINIC | Age: 47
End: 2022-12-13

## 2023-11-20 ENCOUNTER — OUTPATIENT (OUTPATIENT)
Dept: OUTPATIENT SERVICES | Facility: HOSPITAL | Age: 48
LOS: 1 days | End: 2023-11-20
Payer: COMMERCIAL

## 2023-11-20 DIAGNOSIS — R92.8 OTHER ABNORMAL AND INCONCLUSIVE FINDINGS ON DIAGNOSTIC IMAGING OF BREAST: ICD-10-CM

## 2023-11-20 DIAGNOSIS — Z90.710 ACQUIRED ABSENCE OF BOTH CERVIX AND UTERUS: Chronic | ICD-10-CM

## 2023-11-20 PROCEDURE — G0279: CPT | Mod: 26

## 2023-11-20 PROCEDURE — 76641 ULTRASOUND BREAST COMPLETE: CPT | Mod: 50

## 2023-11-20 PROCEDURE — 77066 DX MAMMO INCL CAD BI: CPT

## 2023-11-20 PROCEDURE — 76641 ULTRASOUND BREAST COMPLETE: CPT | Mod: 26,50

## 2023-11-20 PROCEDURE — 77066 DX MAMMO INCL CAD BI: CPT | Mod: 26

## 2023-11-20 PROCEDURE — G0279: CPT

## 2023-11-21 DIAGNOSIS — R92.8 OTHER ABNORMAL AND INCONCLUSIVE FINDINGS ON DIAGNOSTIC IMAGING OF BREAST: ICD-10-CM

## 2025-01-01 NOTE — PRE-ANESTHESIA EVALUATION ADULT - NSANTHBMIRD_ENT_A_CORE
[Dear  ___] : Dear  [unfilled], [Sarkar NP  No

## 2025-01-17 NOTE — PHYSICAL EXAM
[Normocephalic] : normocephalic [Atraumatic] : atraumatic [Examined in the supine and seated position] : examined in the supine and seated position [EOMI] : extra ocular movement intact [Symmetrical] : symmetrical [No dominant masses] : no dominant masses in right breast  [No dominant masses] : no dominant masses left breast [No Nipple Retraction] : no left nipple retraction [No Nipple Discharge] : no left nipple discharge [No Axillary Lymphadenopathy] : no left axillary lymphadenopathy [No Edema] : no edema [No Rashes] : no rashes [No Ulceration] : no ulceration [de-identified] : On physical exam, there are no discrete masses in either breast or axilla. There is no nipple discharge or inversion bilaterally. There are no skin changes bilaterally.\par \par  Silverio Burns(Resident)

## 2025-05-01 ENCOUNTER — APPOINTMENT (OUTPATIENT)
Dept: PULMONOLOGY | Facility: CLINIC | Age: 50
End: 2025-05-01

## 2025-05-23 ENCOUNTER — OUTPATIENT (OUTPATIENT)
Dept: OUTPATIENT SERVICES | Facility: HOSPITAL | Age: 50
LOS: 1 days | End: 2025-05-23
Payer: COMMERCIAL

## 2025-05-23 DIAGNOSIS — Z90.710 ACQUIRED ABSENCE OF BOTH CERVIX AND UTERUS: Chronic | ICD-10-CM

## 2025-05-23 DIAGNOSIS — Z12.31 ENCOUNTER FOR SCREENING MAMMOGRAM FOR MALIGNANT NEOPLASM OF BREAST: ICD-10-CM

## 2025-05-23 PROCEDURE — 77063 BREAST TOMOSYNTHESIS BI: CPT

## 2025-05-23 PROCEDURE — 77063 BREAST TOMOSYNTHESIS BI: CPT | Mod: 26

## 2025-05-23 PROCEDURE — 77067 SCR MAMMO BI INCL CAD: CPT | Mod: 26

## 2025-05-23 PROCEDURE — 77067 SCR MAMMO BI INCL CAD: CPT

## 2025-05-24 DIAGNOSIS — Z12.31 ENCOUNTER FOR SCREENING MAMMOGRAM FOR MALIGNANT NEOPLASM OF BREAST: ICD-10-CM

## 2025-06-17 ENCOUNTER — APPOINTMENT (OUTPATIENT)
Facility: CLINIC | Age: 50
End: 2025-06-17